# Patient Record
Sex: FEMALE | Race: WHITE
[De-identification: names, ages, dates, MRNs, and addresses within clinical notes are randomized per-mention and may not be internally consistent; named-entity substitution may affect disease eponyms.]

---
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
## 2022-09-30 ENCOUNTER — HOSPITAL ENCOUNTER (OUTPATIENT)
Dept: HOSPITAL 75 - LABNPT | Age: 22
End: 2022-09-30
Attending: REGISTERED NURSE
Payer: MEDICAID

## 2022-09-30 DIAGNOSIS — Z01.89: Primary | ICD-10-CM

## 2022-09-30 PROCEDURE — 87591 N.GONORRHOEAE DNA AMP PROB: CPT

## 2022-09-30 PROCEDURE — 87491 CHLMYD TRACH DNA AMP PROBE: CPT

## 2022-10-03 ENCOUNTER — HOSPITAL ENCOUNTER (OUTPATIENT)
Dept: HOSPITAL 75 - RAD | Age: 22
End: 2022-10-03
Attending: FAMILY MEDICINE
Payer: MEDICAID

## 2022-10-03 DIAGNOSIS — Z30.431: Primary | ICD-10-CM

## 2022-10-03 PROCEDURE — 76830 TRANSVAGINAL US NON-OB: CPT

## 2022-10-03 PROCEDURE — 76856 US EXAM PELVIC COMPLETE: CPT

## 2022-10-03 NOTE — DIAGNOSTIC IMAGING REPORT
PROCEDURE: 

Pelvic comp/transvaginal sonogram.



TECHNIQUE: 

Complete transabdominal and transvaginal pelvic ultrasound was

performed. In addition, limited pelvic Doppler was performed.



INDICATION:  

IUD check.



FINDINGS:

The uterus is anteverted measuring 6.1 x 3.0 x 3.8 cm. The IUD

appears to be appropriately centered in the endometrial canal. No

myometrial mass is detected. The endometrium is 5 mm in

thickness. No abnormal vascularity is seen with pelvic Doppler.

An adnexal evaluation was performed. The ovaries could not be

visualized due to overlying bowel gas. No adnexal mass or free

fluid is detected.



IMPRESSION:

1. The IUD is appropriately centered in the endometrial canal.

2. Nonvisualized ovaries.







Dictated by: 



  Dictated on workstation # [id number]